# Patient Record
Sex: MALE | Race: WHITE | Employment: FULL TIME | URBAN - METROPOLITAN AREA
[De-identification: names, ages, dates, MRNs, and addresses within clinical notes are randomized per-mention and may not be internally consistent; named-entity substitution may affect disease eponyms.]

---

## 2024-03-25 ENCOUNTER — OFFICE VISIT (OUTPATIENT)
Dept: PRIMARY CARE CLINIC | Age: 19
End: 2024-03-25
Payer: COMMERCIAL

## 2024-03-25 VITALS
SYSTOLIC BLOOD PRESSURE: 110 MMHG | DIASTOLIC BLOOD PRESSURE: 68 MMHG | HEART RATE: 80 BPM | OXYGEN SATURATION: 99 % | WEIGHT: 139.2 LBS | HEIGHT: 70 IN | BODY MASS INDEX: 19.93 KG/M2

## 2024-03-25 DIAGNOSIS — H61.23 IMPACTED CERUMEN OF BOTH EARS: ICD-10-CM

## 2024-03-25 DIAGNOSIS — J34.2 DEVIATED NASAL SEPTUM: ICD-10-CM

## 2024-03-25 DIAGNOSIS — Z00.00 ROUTINE ADULT HEALTH MAINTENANCE: Primary | ICD-10-CM

## 2024-03-25 PROCEDURE — 99204 OFFICE O/P NEW MOD 45 MIN: CPT | Performed by: STUDENT IN AN ORGANIZED HEALTH CARE EDUCATION/TRAINING PROGRAM

## 2024-03-25 RX ORDER — KETOCONAZOLE 20 MG/ML
SHAMPOO TOPICAL DAILY PRN
COMMUNITY
Start: 2022-05-25 | End: 2024-03-25 | Stop reason: ALTCHOICE

## 2024-03-25 SDOH — ECONOMIC STABILITY: FOOD INSECURITY: WITHIN THE PAST 12 MONTHS, YOU WORRIED THAT YOUR FOOD WOULD RUN OUT BEFORE YOU GOT MONEY TO BUY MORE.: NEVER TRUE

## 2024-03-25 SDOH — ECONOMIC STABILITY: HOUSING INSECURITY
IN THE LAST 12 MONTHS, WAS THERE A TIME WHEN YOU DID NOT HAVE A STEADY PLACE TO SLEEP OR SLEPT IN A SHELTER (INCLUDING NOW)?: NO

## 2024-03-25 SDOH — ECONOMIC STABILITY: FOOD INSECURITY: WITHIN THE PAST 12 MONTHS, THE FOOD YOU BOUGHT JUST DIDN'T LAST AND YOU DIDN'T HAVE MONEY TO GET MORE.: NEVER TRUE

## 2024-03-25 SDOH — ECONOMIC STABILITY: INCOME INSECURITY: HOW HARD IS IT FOR YOU TO PAY FOR THE VERY BASICS LIKE FOOD, HOUSING, MEDICAL CARE, AND HEATING?: NOT HARD AT ALL

## 2024-03-25 ASSESSMENT — PATIENT HEALTH QUESTIONNAIRE - PHQ9
SUM OF ALL RESPONSES TO PHQ QUESTIONS 1-9: 0
SUM OF ALL RESPONSES TO PHQ9 QUESTIONS 1 & 2: 0
1. LITTLE INTEREST OR PLEASURE IN DOING THINGS: NOT AT ALL
SUM OF ALL RESPONSES TO PHQ QUESTIONS 1-9: 0
2. FEELING DOWN, DEPRESSED OR HOPELESS: NOT AT ALL
SUM OF ALL RESPONSES TO PHQ QUESTIONS 1-9: 0
SUM OF ALL RESPONSES TO PHQ QUESTIONS 1-9: 0

## 2024-03-25 NOTE — PATIENT INSTRUCTIONS
Referral placed.  They will call you to schedule.  If they do not call you in 1 week, you can give them a call.     Please use carbamide peroxide, 5 drops in each ear 2 times daily and follow-up for ear cleaning

## 2024-03-25 NOTE — PROGRESS NOTES
3/25/2024        Vladimir Tarango 2005 is a 19 y.o. male who presents today with:  Chief Complaint   Patient presents with    New Patient     Requesting ENT referral        HPI:   Patient presents due to establish care  PMHx: Asthma, controlled without  med.    he denies fever, chills, aches, nausea, vomiting, diarrhea, shortness of breath, chest discomfort, palpitations, sinus congestion, sinus pressure, sinus drainage, ear aches, cough, sick contacts.     Assessment & Plan   1. Routine adult health maintenance  2. Deviated nasal septum  -     Diamond Haque MD , Otolaryngology/ENT - [place]  3. Impacted cerumen of both ears  -     carbamide peroxide (DEBROX) 6.5 % otic solution; Place 5 drops into both ears 2 times daily for 10 days, Disp-15 mL, R-0Normal     Medications Discontinued During This Encounter   Medication Reason    ketoconazole (NIZORAL) 2 % shampoo Therapy completed     No follow-ups on file.        Objective  No Known Allergies  Current Outpatient Medications   Medication Sig Dispense Refill    carbamide peroxide (DEBROX) 6.5 % otic solution Place 5 drops into both ears 2 times daily for 10 days 15 mL 0     No current facility-administered medications for this visit.       PMH, Surgical Hx, Family Hx, and Social Hx reviewed and updated.  Health Maintenance reviewed.    Vitals:    03/25/24 1519   BP: 110/68   Site: Left Upper Arm   Position: Sitting   Cuff Size: Medium Adult   Pulse: 80   SpO2: 99%   Weight: 63.1 kg (139 lb 3.2 oz)   Height: 1.778 m (5' 10\")     BP Readings from Last 3 Encounters:   03/25/24 110/68     Wt Readings from Last 3 Encounters:   03/25/24 63.1 kg (139 lb 3.2 oz)       No results found for: \"LABA1C\"  No results found for: \"CREATININE\"  No results found for: \"ALT\", \"AST\"  No results found for: \"CHOL\", \"TRIG\", \"HDL\", \"LDLCALC\", \"LDLDIRECT\"     Review of Systems   Physical Exam  Vitals reviewed.   Constitutional:       General: He is not in acute distress.